# Patient Record
Sex: FEMALE | URBAN - METROPOLITAN AREA
[De-identification: names, ages, dates, MRNs, and addresses within clinical notes are randomized per-mention and may not be internally consistent; named-entity substitution may affect disease eponyms.]

---

## 2024-01-23 ENCOUNTER — APPOINTMENT (RX ONLY)
Dept: URBAN - METROPOLITAN AREA CLINIC 75 | Facility: CLINIC | Age: 34
Setting detail: DERMATOLOGY
End: 2024-01-23

## 2024-01-30 ENCOUNTER — APPOINTMENT (RX ONLY)
Dept: URBAN - METROPOLITAN AREA CLINIC 75 | Facility: CLINIC | Age: 34
Setting detail: DERMATOLOGY
End: 2024-01-30

## 2024-01-30 DIAGNOSIS — L57.0 ACTINIC KERATOSIS: ICD-10-CM

## 2024-01-30 PROCEDURE — 99203 OFFICE O/P NEW LOW 30 MIN: CPT

## 2024-01-30 PROCEDURE — ? COUNSELING

## 2024-01-30 PROCEDURE — ? PRESCRIPTION

## 2024-01-30 PROCEDURE — ? PRESCRIPTION MEDICATION MANAGEMENT

## 2024-01-30 RX ORDER — FLUOROURACIL 2 G/40G
THIN LAYER CREAM TOPICAL BID
Qty: 40 | Refills: 3 | Status: ERX | COMMUNITY
Start: 2024-01-30

## 2024-01-30 RX ADMIN — FLUOROURACIL THIN LAYER: 2 CREAM TOPICAL at 00:00

## 2024-01-30 NOTE — PROCEDURE: PRESCRIPTION MEDICATION MANAGEMENT
Detail Level: Zone
Initiate Treatment: Efudex 5 % topical cream BID\\nSig: Apply two times a day to actinic keratoses x 2 weeks. Wash off in am.
Render In Strict Bullet Format?: No